# Patient Record
Sex: FEMALE | Race: WHITE
[De-identification: names, ages, dates, MRNs, and addresses within clinical notes are randomized per-mention and may not be internally consistent; named-entity substitution may affect disease eponyms.]

---

## 2021-04-08 ENCOUNTER — TRANSCRIPTION ENCOUNTER (OUTPATIENT)
Age: 66
End: 2021-04-08

## 2021-04-14 ENCOUNTER — EMERGENCY (EMERGENCY)
Facility: HOSPITAL | Age: 66
LOS: 0 days | Discharge: HOME | End: 2021-04-14
Attending: EMERGENCY MEDICINE | Admitting: EMERGENCY MEDICINE
Payer: MEDICARE

## 2021-04-14 VITALS
TEMPERATURE: 98 F | HEART RATE: 85 BPM | SYSTOLIC BLOOD PRESSURE: 181 MMHG | OXYGEN SATURATION: 100 % | WEIGHT: 156.09 LBS | DIASTOLIC BLOOD PRESSURE: 85 MMHG | RESPIRATION RATE: 16 BRPM

## 2021-04-14 DIAGNOSIS — J45.909 UNSPECIFIED ASTHMA, UNCOMPLICATED: ICD-10-CM

## 2021-04-14 DIAGNOSIS — J02.9 ACUTE PHARYNGITIS, UNSPECIFIED: ICD-10-CM

## 2021-04-14 DIAGNOSIS — I10 ESSENTIAL (PRIMARY) HYPERTENSION: ICD-10-CM

## 2021-04-14 DIAGNOSIS — E01.0 IODINE-DEFICIENCY RELATED DIFFUSE (ENDEMIC) GOITER: ICD-10-CM

## 2021-04-14 PROBLEM — Z00.00 ENCOUNTER FOR PREVENTIVE HEALTH EXAMINATION: Status: ACTIVE | Noted: 2021-04-14

## 2021-04-14 PROCEDURE — 99284 EMERGENCY DEPT VISIT MOD MDM: CPT

## 2021-04-14 PROCEDURE — 93010 ELECTROCARDIOGRAM REPORT: CPT

## 2021-04-14 RX ORDER — DEXAMETHASONE 0.5 MG/5ML
10 ELIXIR ORAL ONCE
Refills: 0 | Status: COMPLETED | OUTPATIENT
Start: 2021-04-14 | End: 2021-04-14

## 2021-04-14 RX ADMIN — Medication 10 MILLIGRAM(S): at 06:43

## 2021-04-14 NOTE — ED PROVIDER NOTE - PROVIDER TOKENS
PROVIDER:[TOKEN:[[id number]],FOLLOWUP:[7-10 Days],ESTABLISHEDPATIENT:[T]],PROVIDER:[TOKEN:[[id number]],FOLLOWUP:[7-10 Days]]

## 2021-04-14 NOTE — ED PROVIDER NOTE - CARE PROVIDERS DIRECT ADDRESSES
,DirectAddress_Unknown,[email or web address]

## 2021-04-14 NOTE — ED PROVIDER NOTE - CLINICAL SUMMARY MEDICAL DECISION MAKING FREE TEXT BOX
sore throat, vocal hoarseness, thyromegaly on exam, op thyroid US scheduled for 4/18/21 - vss, ekg, airway intact, tolerating po - decadron, return precautions discussed, op thyroid US as scheduled, op PCP/Endo f/u

## 2021-04-14 NOTE — ED PROVIDER NOTE - PATIENT PORTAL LINK FT
You can access the FollowMyHealth Patient Portal offered by [facility] by registering at the following website: [email or web address]. By joining [employer]’s FollowMyHealth portal, you will also be able to view your health information using other applications (apps) compatible with our system.

## 2021-04-14 NOTE — ED PROVIDER NOTE - CARE PROVIDER_API CALL
Liset Smith)  Internal Medicine  [address] [place] [id number]  Phone: [phone]  Fax: [phone]  Established Patient  Follow Up Time: 7-10 Days    Ruchi Morales  INTERNAL MEDICINE  [address] [place] [id number]  Phone: [phone]  Fax: [phone]  Follow Up Time: 7-10 Days

## 2021-04-14 NOTE — ED PROVIDER NOTE - OBJECTIVE STATEMENT
66F h/o asthma, htn, hl p/w sore throat x 2 wks. Accomp by hoarseness. Seen @ outside US 4/8, rapid covid neg, strep neg. Has been taking motrin, however reports hot flashes & flushing. Saw PCP Dr. Smith 3d ago for f/u, was given Rx for prednisone, took x 2d but stopped bc she started feeling jittery w/palpitations. Was also given Rx to obtain op thyroid US, and was to be given Endo referral today. Pt denies any known h/o thyroid d/o. Denies f/c, rhinorrhea, cough, cp/sob/ortega, nv, abd pain, edema.

## 2021-04-14 NOTE — ED PROVIDER NOTE - PHYSICAL EXAMINATION
PE:  nad  skin warm, dry  ncat  perrl/eomi  eac/tms clear, no rhinorrhea, op clear pharynx nl no tonsillar hypertrophy or exudates, uvula midline, mild vocal hoarseness, no stridor/drooling  neck supple, +thyromegaly  rrr nl s1s2 no mrg  ctab no wrr  abd soft ntnd no palpable masses no rgr  back non-tender no cvat  ext no cce dpi  neuro aaox3 grossly nf exam

## 2021-04-14 NOTE — ED ADULT NURSE NOTE - OBJECTIVE STATEMENT
Pt is c/o sore throat and difficulties [id number]. Pt was prescribed prednisone, but stopped due to hot flashes'

## 2021-04-14 NOTE — ED ADULT TRIAGE NOTE - CHIEF COMPLAINT QUOTE
pt c.o. sore throat   was prescribed ibuprofen and prednisone and stopped taking them due to "hot flashes"

## 2021-04-14 NOTE — ED PROVIDER NOTE - NSFOLLOWUPINSTRUCTIONS_ED_ALL_ED_FT
YOU HAVE BEEN FOUND WITH AN ENLARGED THYROID ON PHYSICAL EXAM. PLEASE FOLLOW-UP FOR YOUR PREVIOUSLY SCHEDULE THYROID ULTRASOUND THIS SUNDAY 4/18/21. PLEASE FOLLOW-UP WITH YOUR PCP, AND AN ENDOCRINOLOGIST.    Sore Throat    A sore throat is pain, burning, irritation, or scratchiness in the throat. When you have a sore throat, you may feel pain or tenderness in your throat when you swallow or talk.    Many things can cause a sore throat, including:    An infection.  Seasonal allergies.  Dryness in the air.  Irritants, such as smoke or pollution.  Gastroesophageal reflux disease (GERD).  A tumor.    A sore throat is often the first sign of another sickness. It may happen with other symptoms, such as coughing, sneezing, fever, and swollen neck glands. Most sore throats go away without medical treatment.     HOME CARE INSTRUCTIONS  Take over-the-counter medicines only as told by your health care provider.  Drink enough fluids to keep your urine clear or pale yellow.  Rest as needed.  To help with pain, try:  Sipping warm liquids, such as broth, herbal tea, or warm water.  Eating or drinking cold or frozen liquids, such as frozen ice pops.  Gargling with a salt-water mixture 3–4 times a day or as needed. To make a salt-water mixture, completely dissolve ½–1 tsp of salt in 1 cup of warm water.  Sucking on hard candy or throat lozenges.  Putting a cool-mist humidifier in your bedroom at night to moisten the air.  Sitting in the bathroom with the door closed for 5–10 minutes while you run hot water in the shower.  Do not use any tobacco products, such as cigarettes, chewing tobacco, and e-cigarettes. If you need help quitting, ask your health care provider.    SEEK MEDICAL CARE IF:  You have a fever for more than 2–3 days.  You have symptoms that last (are persistent) for more than 2–3 days.  Your throat does not get better within 7 days.  You have a fever and your symptoms suddenly get worse.    SEEK IMMEDIATE MEDICAL CARE IF:  You have difficulty breathing.  You cannot swallow fluids, soft foods, or your saliva.  You have increased swelling in your throat or neck.  You have persistent nausea and vomiting.    ADDITIONAL NOTES AND INSTRUCTIONS    Please follow up with your Primary MD in 24-48 hr.  Seek immediate medical care for any new/worsening signs or symptoms.

## 2021-04-14 NOTE — ED PROVIDER NOTE - NS ED ROS FT
Constitutional: No fever, chills, unintended weight loss.  Eyes:  No visual changes, eye pain or discharge.  ENMT: see hpi  Cardiac:  No chest pain, SOB or edema. No chest pain with exertion.  Respiratory:  No cough or respiratory distress. No hemoptysis. No history of asthma or RAD.  GI:  No nausea, vomiting, diarrhea or abdominal pain.  :  No dysuria, frequency or burning.  MS:  No myalgia, muscle weakness, joint pain or back pain.  Neuro:  No headache or weakness.  No LOC.  Skin:  No skin rash.   Endocrine: No history of thyroid disease or diabetes.

## 2021-04-18 ENCOUNTER — OUTPATIENT (OUTPATIENT)
Dept: OUTPATIENT SERVICES | Facility: HOSPITAL | Age: 66
LOS: 1 days | Discharge: HOME | End: 2021-04-18
Payer: MEDICARE

## 2021-04-18 DIAGNOSIS — E04.1 NONTOXIC SINGLE THYROID NODULE: ICD-10-CM

## 2021-04-18 PROCEDURE — 76536 US EXAM OF HEAD AND NECK: CPT | Mod: 26

## 2021-04-21 ENCOUNTER — APPOINTMENT (OUTPATIENT)
Dept: ENDOCRINOLOGY | Facility: CLINIC | Age: 66
End: 2021-04-21
Payer: MEDICARE

## 2021-04-21 VITALS
TEMPERATURE: 98 F | DIASTOLIC BLOOD PRESSURE: 82 MMHG | WEIGHT: 156 LBS | BODY MASS INDEX: 25.99 KG/M2 | HEIGHT: 65 IN | SYSTOLIC BLOOD PRESSURE: 160 MMHG

## 2021-04-21 DIAGNOSIS — Z87.09 PERSONAL HISTORY OF OTHER DISEASES OF THE RESPIRATORY SYSTEM: ICD-10-CM

## 2021-04-21 DIAGNOSIS — Z78.9 OTHER SPECIFIED HEALTH STATUS: ICD-10-CM

## 2021-04-21 DIAGNOSIS — E06.0 ACUTE THYROIDITIS: ICD-10-CM

## 2021-04-21 DIAGNOSIS — Z86.79 PERSONAL HISTORY OF OTHER DISEASES OF THE CIRCULATORY SYSTEM: ICD-10-CM

## 2021-04-21 DIAGNOSIS — Z83.49 FAMILY HISTORY OF OTHER ENDOCRINE, NUTRITIONAL AND METABOLIC DISEASES: ICD-10-CM

## 2021-04-21 PROCEDURE — 99204 OFFICE O/P NEW MOD 45 MIN: CPT

## 2021-04-21 RX ORDER — TELMISARTAN 20 MG/1
TABLET ORAL
Refills: 0 | Status: ACTIVE | COMMUNITY

## 2021-04-21 NOTE — ASSESSMENT
[FreeTextEntry1] : 66 year old patient who was referred by Dr good for evaluation of possible thyroiditis \par \par -neck pain , dysphagia :\par likely acute thyroiditis , normal tft's , heterogenous gland on US , no nodules, pain improving, couldn't  tolerate steroids and ibuprofen did not help \par monitor symptoms, likely will improve soon, can take tylenol if in too much pain\par advised to restart soft diet and advance as tolerated \par discussed hyperthyroidism /hypothyroidism symptoms and patient will report to us if notice any new symptoms \par recheck tft's in 6 weeks from now earlier if reccurence of symptoms \par \par

## 2021-04-21 NOTE — HISTORY OF PRESENT ILLNESS
[FreeTextEntry1] : 66 year old patient who was referred by Dr good for evaluation of possible thyroiditis \par \par 4/8/21 : patient noted sore throat and pain in the neck , was prescribed ibuprofen with no improvement in symptoms, saw dr good and tft's done were normal was started on prednisone  for suspected thyroiditis , but developed SOB and ? reaction to prednisone went to hospital , had EKG and was ok , was given dexamethasone, but also felt flushing and h/a so did not take it . Since then pain is improving ,  a little over left side but patient is afraid to eat.  \par no palpitations, no fever, chills, no diarrhea, no heat intolerance, no constipation, no hair loss or skin changes.\par \par

## 2021-04-21 NOTE — PHYSICAL EXAM
[Alert] : alert [Well Nourished] : well nourished [Healthy Appearance] : healthy appearance [No Acute Distress] : no acute distress [Well Developed] : well developed [No Proptosis] : no proptosis [No Lid Lag] : no lid lag [No Thyroid Nodules] : no palpable thyroid nodules [No Respiratory Distress] : no respiratory distress [No Accessory Muscle Use] : no accessory muscle use [Clear to Auscultation] : lungs were clear to auscultation bilaterally [Normal S1, S2] : normal S1 and S2 [No Murmurs] : no murmurs [Normal Rate] : heart rate was normal [Not Tender] : non-tender [Not Distended] : not distended [Soft] : abdomen soft [No Stigmata of Cushings Syndrome] : no stigmata of Cushings Syndrome [Abdominal Striae] : no abdominal striae [Acanthosis Nigricans] : no acanthosis nigricans [Normal Reflexes] : deep tendon reflexes were 2+ and symmetric [No Tremors] : no tremors [Oriented x3] : oriented to person, place, and time [de-identified] : thyroid not enlarged, no bruit, tender to palpation over left side only

## 2021-04-21 NOTE — DATA REVIEWED
[FreeTextEntry1] : 4/12/21: TSH 2.10  FT4 1.3  FT4 2.19  cbc normal \par \par \par thyroid US (4/18/21) heterogenous thyroid without discrete nodule

## 2021-04-21 NOTE — CONSULT LETTER
[Dear  ___] : Dear  [unfilled], [Consult Letter:] : I had the pleasure of evaluating your patient, [unfilled]. [Please see my note below.] : Please see my note below. [Consult Closing:] : Thank you very much for allowing me to participate in the care of this patient.  If you have any questions, please do not hesitate to contact me. [FreeTextEntry3] : Sadie Walker  [Sincerely,] : Sincerely,

## 2021-04-21 NOTE — REVIEW OF SYSTEMS
[Dysphagia] : dysphagia [Neck Pain] : neck pain [Dysphonia] : dysphonia [Chest Pain] : no chest pain [Slow Heart Rate] : heart rate is not slow [Palpitations] : no palpitations [Lower Ext Edema] : no lower extremity edema [Shortness Of Breath] : no shortness of breath [SOB on Exertion] : no shortness of breath on exertion [Nausea] : no nausea [Constipation] : no constipation [Vomiting] : no vomiting [Diarrhea] : no diarrhea [As Noted in HPI] : as noted in HPI [Acanthosis] : no acanthosis  [Acne] : no acne [Headaches] : no headaches [Tremors] : no tremors [Cold Intolerance] : no cold intolerance [Heat Intolerance] : no heat intolerance [Hot Flashes] : no hot flashes

## 2021-05-10 ENCOUNTER — APPOINTMENT (OUTPATIENT)
Dept: OTOLARYNGOLOGY | Facility: CLINIC | Age: 66
End: 2021-05-10
Payer: MEDICARE

## 2021-05-10 VITALS — HEIGHT: 65 IN | BODY MASS INDEX: 25.99 KG/M2 | WEIGHT: 156 LBS

## 2021-05-10 PROCEDURE — 99204 OFFICE O/P NEW MOD 45 MIN: CPT | Mod: 25

## 2021-05-10 PROCEDURE — 31575 DIAGNOSTIC LARYNGOSCOPY: CPT

## 2021-05-10 RX ORDER — CHLORHEXIDINE GLUCONATE, 0.12% ORAL RINSE 1.2 MG/ML
0.12 SOLUTION DENTAL
Qty: 473 | Refills: 0 | Status: ACTIVE | COMMUNITY
Start: 2020-12-15

## 2021-05-10 RX ORDER — IBUPROFEN 600 MG/1
600 TABLET, FILM COATED ORAL
Qty: 28 | Refills: 0 | Status: ACTIVE | COMMUNITY
Start: 2021-04-08

## 2021-05-10 NOTE — PHYSICAL EXAM
[Normal] : mucosa is normal [Midline] : trachea located in midline position [de-identified] : right scm tenderness

## 2021-05-10 NOTE — HISTORY OF PRESENT ILLNESS
[de-identified] : Patient presents today c/o swollen/ sore throat.  Began one month ago. Was seen in urgent care told it was virus.  After a week she followed up with her PCP and ultrasound was ordered. Was seen by endocrine and PCP and labs ordered were normal . Right side of throat is worst then left side, can only swallow soft foods. She has hoarse voice and dysphagia. She denies reflux or PND

## 2021-05-10 NOTE — HISTORY OF PRESENT ILLNESS
[de-identified] : Patient presents today c/o swollen/ sore throat.  Began one month ago. Was seen in urgent care told it was virus.  After a week she followed up with her PCP and ultrasound was ordered. Was seen by endocrine and PCP and labs ordered were normal . Right side of throat is worst then left side, can only swallow soft foods. She has hoarse voice and dysphagia. She denies reflux or PND

## 2021-05-10 NOTE — REASON FOR VISIT
[Initial Evaluation] : an initial evaluation for [FreeTextEntry2] : see other note for 5/10 for  documentation and billing

## 2021-05-10 NOTE — PHYSICAL EXAM
[Normal] : mucosa is normal [Midline] : trachea located in midline position [de-identified] : right scm tenderness

## 2021-05-12 ENCOUNTER — RX RENEWAL (OUTPATIENT)
Age: 66
End: 2021-05-12

## 2021-06-14 ENCOUNTER — APPOINTMENT (OUTPATIENT)
Dept: OTOLARYNGOLOGY | Facility: CLINIC | Age: 66
End: 2021-06-14
Payer: MEDICARE

## 2021-06-14 PROCEDURE — 31237 NSL/SINS NDSC SURG BX POLYPC: CPT | Mod: RT

## 2021-06-14 PROCEDURE — 31575 DIAGNOSTIC LARYNGOSCOPY: CPT

## 2021-06-14 PROCEDURE — 99214 OFFICE O/P EST MOD 30 MIN: CPT | Mod: 25

## 2021-06-14 NOTE — ASSESSMENT
[FreeTextEntry1] : Pt was counselled on a low acid acid and appropriate dietary modifications.\par \par Pt will continue meds.

## 2021-06-14 NOTE — REASON FOR VISIT
[Subsequent Evaluation] : a subsequent evaluation for [FreeTextEntry2] : neck swelling , throat pain , LPRD

## 2021-06-14 NOTE — PROCEDURE
[Rigid Endoscope] : examined with a rigid endoscope [___ cm] : [unfilled]Ucm polyp(s) on the right [FreeTextEntry6] : nasal mass  removed from right nasal cavity [Flexible Endoscope] : examined with the flexible endoscope [True Vocal Cords Erythematous] : bilateral true vocal cord edema [Glottis Arytenoid Cartilages Erythema] : bilateral arytenoid ~M erythema [Normal] : posterior cricoid area had healthy pink mucosa in the interarytenoid area and the esophageal inlet [de-identified] : nasal polyps on the right \par larynx improving

## 2021-06-14 NOTE — HISTORY OF PRESENT ILLNESS
[FreeTextEntry1] : Patient returns today following up on  neck swelling , throat pain , LPRD .  She is doing better.  Slight discomfort when swallowing .

## 2021-06-18 LAB — CORE LAB BIOPSY: NORMAL

## 2021-06-28 ENCOUNTER — APPOINTMENT (OUTPATIENT)
Dept: OTOLARYNGOLOGY | Facility: CLINIC | Age: 66
End: 2021-06-28
Payer: MEDICARE

## 2021-06-28 DIAGNOSIS — M60.9 MYOSITIS, UNSPECIFIED: ICD-10-CM

## 2021-06-28 PROCEDURE — 31231 NASAL ENDOSCOPY DX: CPT

## 2021-06-28 PROCEDURE — 99213 OFFICE O/P EST LOW 20 MIN: CPT | Mod: 25

## 2021-06-28 NOTE — HISTORY OF PRESENT ILLNESS
[FreeTextEntry1] : patient returns today following up on neck swelling , nasal mass. Had tissue biopsy at last visit , here to discuss results . She is doing well since last visit . Pathology was reviewed and shows benign polyp. Pt is doing well from a reflux standpoint on meds and diet.

## 2021-12-14 ENCOUNTER — APPOINTMENT (OUTPATIENT)
Dept: OTOLARYNGOLOGY | Facility: CLINIC | Age: 66
End: 2021-12-14
Payer: MEDICARE

## 2021-12-14 DIAGNOSIS — R22.1 LOCALIZED SWELLING, MASS AND LUMP, NECK: ICD-10-CM

## 2021-12-14 DIAGNOSIS — R07.0 PAIN IN THROAT: ICD-10-CM

## 2021-12-14 DIAGNOSIS — K21.9 GASTRO-ESOPHAGEAL REFLUX DISEASE W/OUT ESOPHAGITIS: ICD-10-CM

## 2021-12-14 PROCEDURE — 31231 NASAL ENDOSCOPY DX: CPT

## 2021-12-14 PROCEDURE — 99214 OFFICE O/P EST MOD 30 MIN: CPT | Mod: 25

## 2021-12-14 RX ORDER — FAMOTIDINE 40 MG/1
40 TABLET, FILM COATED ORAL
Qty: 90 | Refills: 0 | Status: ACTIVE | COMMUNITY
Start: 2021-05-10 | End: 1900-01-01

## 2021-12-14 NOTE — HISTORY OF PRESENT ILLNESS
[FreeTextEntry1] : Patient returns today following up on  nasal polyps, LPRD .  Has improvement since last visit.   Hasn't been on reflux  medication .

## 2021-12-14 NOTE — PROCEDURE
[None] : none [Flexible Endoscope] : examined with the flexible endoscope [Normal] : posterior cricoid area had healthy pink mucosa in the interarytenoid area and the esophageal inlet [FreeTextEntry6] : right nasal polyp

## 2022-02-14 ENCOUNTER — RX RENEWAL (OUTPATIENT)
Age: 67
End: 2022-02-14

## 2022-02-23 RX ORDER — DIAZEPAM 10 MG/1
10 TABLET ORAL
Qty: 1 | Refills: 0 | Status: ACTIVE | COMMUNITY
Start: 2022-02-23 | End: 1900-01-01

## 2022-02-24 ENCOUNTER — APPOINTMENT (OUTPATIENT)
Dept: OTOLARYNGOLOGY | Facility: CLINIC | Age: 67
End: 2022-02-24
Payer: MEDICARE

## 2022-02-24 DIAGNOSIS — J34.89 OTHER SPECIFIED DISORDERS OF NOSE AND NASAL SINUSES: ICD-10-CM

## 2022-02-24 PROCEDURE — 31237 NSL/SINS NDSC SURG BX POLYPC: CPT | Mod: 50

## 2022-02-24 NOTE — PROCEDURE
[FreeTextEntry1] : polyp removal bilateral  [FreeTextEntry2] : nasal polyps  [FreeTextEntry3] : Topical anesthetic was applied. a Blakesley forceps was then used to remove polyps from the middle meatus and the ethmoid regions bilaterally. Hemostasis was achieved with nasal packing. Pt tolerated te procedure well.

## 2022-03-08 LAB — CORE LAB BIOPSY: NORMAL

## 2022-03-10 ENCOUNTER — APPOINTMENT (OUTPATIENT)
Dept: OTOLARYNGOLOGY | Facility: CLINIC | Age: 67
End: 2022-03-10
Payer: MEDICARE

## 2022-03-10 PROCEDURE — 31231 NASAL ENDOSCOPY DX: CPT

## 2022-03-10 PROCEDURE — 99213 OFFICE O/P EST LOW 20 MIN: CPT | Mod: 25

## 2022-03-10 RX ORDER — FLUTICASONE PROPIONATE 93 UG/1
93 SPRAY, METERED NASAL
Qty: 1 | Refills: 10 | Status: ACTIVE | COMMUNITY
Start: 2022-02-24 | End: 1900-01-01

## 2022-03-10 NOTE — HISTORY OF PRESENT ILLNESS
[FreeTextEntry1] : Patient here b/l nasal polypectomy 2/24/22.   She is  doing well , no issues .   she  is able to breathe  well .

## 2022-03-10 NOTE — PHYSICAL EXAM
[Nasal Endoscopy Performed] : nasal endoscopy was performed, see procedure section for findings [Normal] : external appearance is normal [de-identified] : thickened

## 2022-05-18 ENCOUNTER — RX RENEWAL (OUTPATIENT)
Age: 67
End: 2022-05-18

## 2022-05-18 RX ORDER — PANTOPRAZOLE 40 MG/1
40 TABLET, DELAYED RELEASE ORAL
Qty: 90 | Refills: 0 | Status: ACTIVE | COMMUNITY
Start: 2021-05-10 | End: 1900-01-01

## 2022-05-19 ENCOUNTER — APPOINTMENT (OUTPATIENT)
Dept: OTOLARYNGOLOGY | Facility: CLINIC | Age: 67
End: 2022-05-19
Payer: MEDICARE

## 2022-05-19 DIAGNOSIS — J33.9 NASAL POLYP, UNSPECIFIED: ICD-10-CM

## 2022-05-19 DIAGNOSIS — Z98.890 OTHER SPECIFIED POSTPROCEDURAL STATES: ICD-10-CM

## 2022-05-19 PROCEDURE — 31231 NASAL ENDOSCOPY DX: CPT

## 2022-05-19 PROCEDURE — 99213 OFFICE O/P EST LOW 20 MIN: CPT | Mod: 25

## 2022-05-19 NOTE — PROCEDURE
[Post-Op Patency] : post-op patency [None] : none [Rigid Endoscope] : examined with a rigid endoscope [Normal] : the paranasal sinuses had no abnormalities [FreeTextEntry6] : no polyps visible

## 2022-05-19 NOTE — HISTORY OF PRESENT ILLNESS
[FreeTextEntry1] : Patient here b/l nasal polypectomy 2/24/22.   She is feel great , no issues with breathe . Pt is not on nasal sprays. Pt has good smell and taste.

## 2022-11-15 ENCOUNTER — NON-APPOINTMENT (OUTPATIENT)
Age: 67
End: 2022-11-15

## 2022-12-15 ENCOUNTER — APPOINTMENT (OUTPATIENT)
Age: 67
End: 2022-12-15

## 2022-12-21 ENCOUNTER — APPOINTMENT (OUTPATIENT)
Dept: PULMONOLOGY | Facility: CLINIC | Age: 67
End: 2022-12-21

## 2022-12-21 VITALS
SYSTOLIC BLOOD PRESSURE: 120 MMHG | BODY MASS INDEX: 25.22 KG/M2 | RESPIRATION RATE: 14 BRPM | DIASTOLIC BLOOD PRESSURE: 80 MMHG | WEIGHT: 151.38 LBS | HEART RATE: 70 BPM | OXYGEN SATURATION: 99 % | HEIGHT: 65 IN

## 2022-12-21 PROCEDURE — 94010 BREATHING CAPACITY TEST: CPT

## 2022-12-21 PROCEDURE — 99204 OFFICE O/P NEW MOD 45 MIN: CPT | Mod: 25

## 2022-12-21 RX ORDER — BUDESONIDE 180 UG/1
180 AEROSOL, POWDER RESPIRATORY (INHALATION)
Qty: 1 | Refills: 0 | Status: DISCONTINUED | COMMUNITY
Start: 2021-01-14 | End: 2022-12-21

## 2022-12-21 RX ORDER — CEPHALEXIN 500 MG/1
500 CAPSULE ORAL
Qty: 21 | Refills: 0 | Status: DISCONTINUED | COMMUNITY
Start: 2021-04-26 | End: 2022-12-21

## 2022-12-21 RX ORDER — AMOXICILLIN 500 MG/1
500 CAPSULE ORAL
Qty: 21 | Refills: 0 | Status: DISCONTINUED | COMMUNITY
Start: 2020-12-15 | End: 2022-12-21

## 2022-12-21 RX ORDER — METHYLPREDNISOLONE 4 MG/1
4 TABLET ORAL
Qty: 21 | Refills: 0 | Status: DISCONTINUED | COMMUNITY
Start: 2021-04-14 | End: 2022-12-21

## 2022-12-21 RX ORDER — ALBUTEROL SULFATE 90 UG/1
108 (90 BASE) INHALANT RESPIRATORY (INHALATION)
Qty: 7 | Refills: 0 | Status: DISCONTINUED | COMMUNITY
Start: 2021-01-14 | End: 2022-12-21

## 2022-12-21 RX ORDER — PREDNISONE 20 MG/1
20 TABLET ORAL
Qty: 20 | Refills: 0 | Status: DISCONTINUED | COMMUNITY
Start: 2021-04-12 | End: 2022-12-21

## 2022-12-21 NOTE — PROCEDURE
[FreeTextEntry1] : In-office spirometry without bronchodilator was performed.\par \par Spirometry demonstrates moderate obstructive disease by ATS criteria.  Flow volume loops demonstrate small airway obstruction.

## 2022-12-21 NOTE — HISTORY OF PRESENT ILLNESS
[Never] : never [TextBox_4] : Ms. TRIPP is a 67 year woman with a medical history significant for mild asthma presenting today to the clinic for subacute cough. She was referred by her PCP for further evaluation of bronchitis/asthma\par \par Ms Tripp's symptoms began on November 16, with acute onset cough and malaise.  She presented the following day to urgent care for testing for COVID, and was found to be negative.  She underwent chest x-ray which she was told showed bronchitis.  At this point she was already taking Pulmicort and albuterol quite regularly without much relief.  For the next several weeks she continued to take these 2 medications, still without relief and significant mucus production, until an appointment with her primary care doctor the week of December 14.\par \par Her primary care doctor started her on Breo, and she was given a 5-day course of azithromycin, and told to follow-up with pulmonary.  At this time, her cough symptoms have almost completely abated.  She reports never having had an asthma flare like this before, and has never had a bronchitis episode that has lasted so long.\par \par Occupational Hx: retired [employer] employee\par

## 2023-03-13 ENCOUNTER — APPOINTMENT (OUTPATIENT)
Dept: PULMONOLOGY | Facility: CLINIC | Age: 68
End: 2023-03-13
Payer: MEDICARE

## 2023-03-13 VITALS
BODY MASS INDEX: 24.99 KG/M2 | RESPIRATION RATE: 14 BRPM | OXYGEN SATURATION: 98 % | HEART RATE: 71 BPM | HEIGHT: 65 IN | SYSTOLIC BLOOD PRESSURE: 120 MMHG | WEIGHT: 150 LBS | DIASTOLIC BLOOD PRESSURE: 80 MMHG

## 2023-03-13 PROCEDURE — 99213 OFFICE O/P EST LOW 20 MIN: CPT | Mod: GC

## 2023-03-13 NOTE — HISTORY OF PRESENT ILLNESS
[Never] : never [TextBox_4] : Ms. TRIPP is a 67 year woman with a medical history significant for mild asthma presenting today to the clinic for subacute cough. She was referred by her PCP for further evaluation of bronchitis/asthma\par \par Patient summary:\par Ms Tripp's symptoms began on November 16, with acute onset cough and malaise.  She presented the following day to urgent care for testing for COVID, and was found to be negative.  She underwent chest x-ray which she was told showed bronchitis.  At this point she was already taking Pulmicort and albuterol quite regularly without much relief.  For the next several weeks she continued to take these 2 medications, still without relief and significant mucus production, until an appointment with her primary care doctor the week of December 14.\par \par Her primary care doctor started her on Breo, and she was given a 5-day course of azithromycin, and told to follow-up with pulmonary.  At this time, her cough symptoms have almost completely abated.  She reports never having had an asthma flare like this before, and has never had a bronchitis episode that has lasted so long.  At our initial visit, we started her on PRN Symbicort based on obstructive findings on spirometry.\par \par Occupational Hx: retired [employer] employee\par \par \par Interval history:\par 1x/day breo\par 0x/day albuterol\par 0x/week night awakening\par change in med made her sx so much better\par Denies any symptoms of any kind at this point\par

## 2023-06-07 ENCOUNTER — OUTPATIENT (OUTPATIENT)
Dept: OUTPATIENT SERVICES | Facility: HOSPITAL | Age: 68
LOS: 1 days | End: 2023-06-07
Payer: MEDICARE

## 2023-06-07 ENCOUNTER — APPOINTMENT (OUTPATIENT)
Dept: PULMONOLOGY | Facility: HOSPITAL | Age: 68
End: 2023-06-07
Payer: MEDICARE

## 2023-06-07 DIAGNOSIS — J45.40 MODERATE PERSISTENT ASTHMA, UNCOMPLICATED: ICD-10-CM

## 2023-06-07 PROCEDURE — 94070 EVALUATION OF WHEEZING: CPT

## 2023-06-07 PROCEDURE — 94060 EVALUATION OF WHEEZING: CPT | Mod: 26

## 2023-06-07 PROCEDURE — 94726 PLETHYSMOGRAPHY LUNG VOLUMES: CPT | Mod: 26

## 2023-06-07 PROCEDURE — 94729 DIFFUSING CAPACITY: CPT | Mod: 26

## 2023-06-07 PROCEDURE — 94726 PLETHYSMOGRAPHY LUNG VOLUMES: CPT

## 2023-06-07 PROCEDURE — 94729 DIFFUSING CAPACITY: CPT

## 2023-06-08 DIAGNOSIS — J45.40 MODERATE PERSISTENT ASTHMA, UNCOMPLICATED: ICD-10-CM

## 2023-06-21 ENCOUNTER — APPOINTMENT (OUTPATIENT)
Dept: PULMONOLOGY | Facility: CLINIC | Age: 68
End: 2023-06-21
Payer: MEDICARE

## 2023-06-21 VITALS
HEIGHT: 65 IN | OXYGEN SATURATION: 97 % | HEART RATE: 65 BPM | SYSTOLIC BLOOD PRESSURE: 130 MMHG | WEIGHT: 154 LBS | DIASTOLIC BLOOD PRESSURE: 84 MMHG | BODY MASS INDEX: 25.66 KG/M2

## 2023-06-21 PROCEDURE — 99214 OFFICE O/P EST MOD 30 MIN: CPT

## 2023-06-21 RX ORDER — BUDESONIDE AND FORMOTEROL FUMARATE DIHYDRATE 80; 4.5 UG/1; UG/1
80-4.5 AEROSOL RESPIRATORY (INHALATION) 4 TIMES DAILY
Qty: 1 | Refills: 5 | Status: DISCONTINUED | COMMUNITY
Start: 2022-12-21 | End: 2023-06-21

## 2023-06-21 NOTE — HISTORY OF PRESENT ILLNESS
[Never] : never [TextBox_4] : Ms. TRIPP is a 67 year woman with a medical history significant for mild asthma presenting today to the clinic for subacute cough. She was referred by her PCP for further evaluation of bronchitis/asthma\par \par Patient summary:\par Ms Tripp's symptoms began on November 16, with acute onset cough and malaise.  She presented the following day to urgent care for testing for COVID, and was found to be negative.  She underwent chest x-ray which she was told showed bronchitis.  At this point she was already taking Pulmicort and albuterol quite regularly without much relief.  For the next several weeks she continued to take these 2 medications, still without relief and significant mucus production, until an appointment with her primary care doctor the week of December 14.\par \par Her primary care doctor started her on Breo, and she was given a 5-day course of azithromycin, and told to follow-up with pulmonary.  At this time, her cough symptoms have almost completely abated.  She reports never having had an asthma flare like this before, and has never had a bronchitis episode that has lasted so long.  At our initial visit, we started her on PRN Symbicort based on obstructive findings on spirometry.\par \par Occupational Hx: retired [employer] employee\par \par \par Interval history:\par 1x/day breo\par 0x/day albuterol\par 0x/week night awakening\par change in med made her sx so much better\par Denies any symptoms of any kind at this point\par We reviewed her PFT results which demonstrated mild obstruction\par

## 2023-06-21 NOTE — PHYSICAL EXAM
[No Acute Distress] : no acute distress [Normal Oropharynx] : normal oropharynx [Normal Appearance] : normal appearance [No Neck Mass] : no neck mass [No Murmurs] : no murmurs [No Resp Distress] : no resp distress [No Abnormalities] : no abnormalities [Benign] : benign [Normal Gait] : normal gait [No Clubbing] : no clubbing [No Cyanosis] : no cyanosis [No Edema] : no edema [FROM] : FROM [Normal Color/ Pigmentation] : normal color/ pigmentation [No Focal Deficits] : no focal deficits [Oriented x3] : oriented x3 [Normal Affect] : normal affect

## 2024-01-11 ENCOUNTER — APPOINTMENT (OUTPATIENT)
Dept: PULMONOLOGY | Facility: CLINIC | Age: 69
End: 2024-01-11
Payer: MEDICARE

## 2024-01-11 VITALS
WEIGHT: 150 LBS | HEART RATE: 74 BPM | BODY MASS INDEX: 24.99 KG/M2 | HEIGHT: 65 IN | RESPIRATION RATE: 14 BRPM | DIASTOLIC BLOOD PRESSURE: 70 MMHG | SYSTOLIC BLOOD PRESSURE: 110 MMHG | OXYGEN SATURATION: 99 %

## 2024-01-11 DIAGNOSIS — J45.40 MODERATE PERSISTENT ASTHMA, UNCOMPLICATED: ICD-10-CM

## 2024-01-11 DIAGNOSIS — R05.8 OTHER SPECIFIED COUGH: ICD-10-CM

## 2024-01-11 DIAGNOSIS — R05.9 COUGH, UNSPECIFIED: ICD-10-CM

## 2024-01-11 PROCEDURE — 99214 OFFICE O/P EST MOD 30 MIN: CPT

## 2024-01-11 RX ORDER — FLUTICASONE FUROATE AND VILANTEROL TRIFENATATE 100; 25 UG/1; UG/1
100-25 POWDER RESPIRATORY (INHALATION)
Qty: 1 | Refills: 4 | Status: ACTIVE | COMMUNITY
Start: 2023-06-21 | End: 1900-01-01

## 2024-01-11 NOTE — HISTORY OF PRESENT ILLNESS
[Never] : never [TextBox_4] : Ms. TRIPP is a 67 year woman with a medical history significant for mild asthma presenting today to the clinic for subacute cough. She was referred by her PCP for further evaluation of bronchitis/asthma  Patient summary: Ms Tripp's symptoms began on November 16, with acute onset cough and malaise.  She presented the following day to urgent care for testing for COVID, and was found to be negative.  She underwent chest x-ray which she was told showed bronchitis.  At this point she was already taking Pulmicort and albuterol quite regularly without much relief.  For the next several weeks she continued to take these 2 medications, still without relief and significant mucus production, until an appointment with her primary care doctor the week of December 14.  Her primary care doctor started her on Breo, and she was given a 5-day course of azithromycin, and told to follow-up with pulmonary.  At this time, her cough symptoms have almost completely abated.  She reports never having had an asthma flare like this before, and has never had a bronchitis episode that has lasted so long.  At our initial visit, we started her on PRN Symbicort based on obstructive findings on spirometry.  Due to insurance reasons she was changed to Breo, and her symptoms have essentially resolved as of June 2023 with maintenance medication.  Occupational Hx: retired [employer] employee   Interval history: 0-1x/day breo 0x/day albuterol 0x/week night awakening  Feels great, rarely ever needs her inhaler before.

## 2024-04-27 ENCOUNTER — EMERGENCY (EMERGENCY)
Facility: HOSPITAL | Age: 69
LOS: 0 days | Discharge: HOME | End: 2024-04-27
Attending: EMERGENCY MEDICINE
Payer: MEDICARE

## 2024-04-27 VITALS
WEIGHT: 156.09 LBS | SYSTOLIC BLOOD PRESSURE: 181 MMHG | RESPIRATION RATE: 17 BRPM | DIASTOLIC BLOOD PRESSURE: 91 MMHG | OXYGEN SATURATION: 100 % | HEART RATE: 80 BPM | TEMPERATURE: 98 F

## 2024-04-27 DIAGNOSIS — R31.9 HEMATURIA, UNSPECIFIED: ICD-10-CM

## 2024-04-27 DIAGNOSIS — Z90.710 ACQUIRED ABSENCE OF BOTH CERVIX AND UTERUS: ICD-10-CM

## 2024-04-27 DIAGNOSIS — I10 ESSENTIAL (PRIMARY) HYPERTENSION: ICD-10-CM

## 2024-04-27 DIAGNOSIS — R30.0 DYSURIA: ICD-10-CM

## 2024-04-27 DIAGNOSIS — N93.9 ABNORMAL UTERINE AND VAGINAL BLEEDING, UNSPECIFIED: ICD-10-CM

## 2024-04-27 DIAGNOSIS — N39.0 URINARY TRACT INFECTION, SITE NOT SPECIFIED: ICD-10-CM

## 2024-04-27 DIAGNOSIS — J45.909 UNSPECIFIED ASTHMA, UNCOMPLICATED: ICD-10-CM

## 2024-04-27 LAB
ALBUMIN SERPL ELPH-MCNC: 4.7 G/DL — SIGNIFICANT CHANGE UP (ref 3.5–5.2)
ALP SERPL-CCNC: 95 U/L — SIGNIFICANT CHANGE UP (ref 30–115)
ALT FLD-CCNC: 17 U/L — SIGNIFICANT CHANGE UP (ref 0–41)
ANION GAP SERPL CALC-SCNC: 9 MMOL/L — SIGNIFICANT CHANGE UP (ref 7–14)
APPEARANCE UR: ABNORMAL
AST SERPL-CCNC: 17 U/L — SIGNIFICANT CHANGE UP (ref 0–41)
BACTERIA # UR AUTO: NEGATIVE /HPF — SIGNIFICANT CHANGE UP
BASOPHILS # BLD AUTO: 0.04 K/UL — SIGNIFICANT CHANGE UP (ref 0–0.2)
BASOPHILS NFR BLD AUTO: 0.3 % — SIGNIFICANT CHANGE UP (ref 0–1)
BILIRUB SERPL-MCNC: 0.3 MG/DL — SIGNIFICANT CHANGE UP (ref 0.2–1.2)
BILIRUB UR-MCNC: NEGATIVE — SIGNIFICANT CHANGE UP
BUN SERPL-MCNC: 20 MG/DL — SIGNIFICANT CHANGE UP (ref 10–20)
CALCIUM SERPL-MCNC: 10.2 MG/DL — SIGNIFICANT CHANGE UP (ref 8.4–10.5)
CAST: 1 /LPF — SIGNIFICANT CHANGE UP (ref 0–4)
CHLORIDE SERPL-SCNC: 105 MMOL/L — SIGNIFICANT CHANGE UP (ref 98–110)
CO2 SERPL-SCNC: 28 MMOL/L — SIGNIFICANT CHANGE UP (ref 17–32)
COLOR SPEC: ABNORMAL
CREAT SERPL-MCNC: 0.8 MG/DL — SIGNIFICANT CHANGE UP (ref 0.7–1.5)
DIFF PNL FLD: ABNORMAL
EGFR: 79 ML/MIN/1.73M2 — SIGNIFICANT CHANGE UP
EOSINOPHIL # BLD AUTO: 0.1 K/UL — SIGNIFICANT CHANGE UP (ref 0–0.7)
EOSINOPHIL NFR BLD AUTO: 0.7 % — SIGNIFICANT CHANGE UP (ref 0–8)
GLUCOSE SERPL-MCNC: 114 MG/DL — HIGH (ref 70–99)
GLUCOSE UR QL: NEGATIVE MG/DL — SIGNIFICANT CHANGE UP
HCT VFR BLD CALC: 39.3 % — SIGNIFICANT CHANGE UP (ref 37–47)
HGB BLD-MCNC: 13 G/DL — SIGNIFICANT CHANGE UP (ref 12–16)
IMM GRANULOCYTES NFR BLD AUTO: 0.2 % — SIGNIFICANT CHANGE UP (ref 0.1–0.3)
KETONES UR-MCNC: NEGATIVE MG/DL — SIGNIFICANT CHANGE UP
LEUKOCYTE ESTERASE UR-ACNC: ABNORMAL
LIDOCAIN IGE QN: 35 U/L — SIGNIFICANT CHANGE UP (ref 7–60)
LYMPHOCYTES # BLD AUTO: 1.09 K/UL — LOW (ref 1.2–3.4)
LYMPHOCYTES # BLD AUTO: 7.7 % — LOW (ref 20.5–51.1)
MCHC RBC-ENTMCNC: 30.6 PG — SIGNIFICANT CHANGE UP (ref 27–31)
MCHC RBC-ENTMCNC: 33.1 G/DL — SIGNIFICANT CHANGE UP (ref 32–37)
MCV RBC AUTO: 92.5 FL — SIGNIFICANT CHANGE UP (ref 81–99)
MONOCYTES # BLD AUTO: 1.08 K/UL — HIGH (ref 0.1–0.6)
MONOCYTES NFR BLD AUTO: 7.6 % — SIGNIFICANT CHANGE UP (ref 1.7–9.3)
NEUTROPHILS # BLD AUTO: 11.85 K/UL — HIGH (ref 1.4–6.5)
NEUTROPHILS NFR BLD AUTO: 83.5 % — HIGH (ref 42.2–75.2)
NITRITE UR-MCNC: NEGATIVE — SIGNIFICANT CHANGE UP
NRBC # BLD: 0 /100 WBCS — SIGNIFICANT CHANGE UP (ref 0–0)
PH UR: 5.5 — SIGNIFICANT CHANGE UP (ref 5–8)
PLATELET # BLD AUTO: 276 K/UL — SIGNIFICANT CHANGE UP (ref 130–400)
PMV BLD: 10.3 FL — SIGNIFICANT CHANGE UP (ref 7.4–10.4)
POTASSIUM SERPL-MCNC: 5.3 MMOL/L — HIGH (ref 3.5–5)
POTASSIUM SERPL-SCNC: 5.3 MMOL/L — HIGH (ref 3.5–5)
PROT SERPL-MCNC: 7.6 G/DL — SIGNIFICANT CHANGE UP (ref 6–8)
PROT UR-MCNC: 30 MG/DL
RBC # BLD: 4.25 M/UL — SIGNIFICANT CHANGE UP (ref 4.2–5.4)
RBC # FLD: 12.3 % — SIGNIFICANT CHANGE UP (ref 11.5–14.5)
RBC CASTS # UR COMP ASSIST: 2 /HPF — SIGNIFICANT CHANGE UP (ref 0–4)
SODIUM SERPL-SCNC: 142 MMOL/L — SIGNIFICANT CHANGE UP (ref 135–146)
SP GR SPEC: 1.01 — SIGNIFICANT CHANGE UP (ref 1–1.03)
SQUAMOUS # UR AUTO: 1 /HPF — SIGNIFICANT CHANGE UP (ref 0–5)
UROBILINOGEN FLD QL: 0.2 MG/DL — SIGNIFICANT CHANGE UP (ref 0.2–1)
WBC # BLD: 14.19 K/UL — HIGH (ref 4.8–10.8)
WBC # FLD AUTO: 14.19 K/UL — HIGH (ref 4.8–10.8)
WBC UR QL: 348 /HPF — HIGH (ref 0–5)

## 2024-04-27 PROCEDURE — 76830 TRANSVAGINAL US NON-OB: CPT | Mod: 26

## 2024-04-27 PROCEDURE — 87086 URINE CULTURE/COLONY COUNT: CPT

## 2024-04-27 PROCEDURE — 81001 URINALYSIS AUTO W/SCOPE: CPT

## 2024-04-27 PROCEDURE — 99284 EMERGENCY DEPT VISIT MOD MDM: CPT | Mod: 25

## 2024-04-27 PROCEDURE — 36415 COLL VENOUS BLD VENIPUNCTURE: CPT

## 2024-04-27 PROCEDURE — 83690 ASSAY OF LIPASE: CPT

## 2024-04-27 PROCEDURE — 87186 SC STD MICRODIL/AGAR DIL: CPT

## 2024-04-27 PROCEDURE — 85025 COMPLETE CBC W/AUTO DIFF WBC: CPT

## 2024-04-27 PROCEDURE — 80053 COMPREHEN METABOLIC PANEL: CPT

## 2024-04-27 PROCEDURE — 76830 TRANSVAGINAL US NON-OB: CPT

## 2024-04-27 PROCEDURE — 99284 EMERGENCY DEPT VISIT MOD MDM: CPT | Mod: GC

## 2024-04-27 RX ORDER — NITROFURANTOIN MACROCRYSTAL 50 MG
1 CAPSULE ORAL
Qty: 10 | Refills: 0
Start: 2024-04-27 | End: 2024-05-01

## 2024-04-27 NOTE — ED PROVIDER NOTE - PHYSICAL EXAMINATION
VITAL SIGNS: I have reviewed nursing notes and confirm.  CONSTITUTIONAL: well-appearing, non-toxic, NAD  SKIN: Warm dry, normal skin turgor  HEAD: NCAT  EYES: no scleral icterus  ENT: Moist mucous membranes  NECK: Supple; non tender. Full ROM.  CARD: RRR, no murmurs, rubs or gallops  RESP: clear to ausculation b/l.  No rales, rhonchi, or wheezing.  ABD: soft, non-tender, non-distended, no rebound or guarding. No CVA tenderness  EXT: Full ROM, no pedal edema  NEURO: normal motor. normal sensory. Normal gait.  PSYCH: Cooperative, appropriate.

## 2024-04-27 NOTE — ED ADULT NURSE NOTE - SUICIDE SCREENING DEPRESSION
[Initial Consultation] : an initial consultation [Medical Records] : medical records [Father] : father Negative

## 2024-04-27 NOTE — ED PROVIDER NOTE - NSFOLLOWUPINSTRUCTIONS_ED_ALL_ED_FT
Please follow-up with your primary care physician.  Below is information regarding a urologist.  If symptoms persist please follow-up with the urologist.    Macrobid 100 mg to be taken 2 times per day for the next 5 days for UTI.  Please complete the full course of antibiotics.    Urinary Tract Infection    A urinary tract infection (UTI) is an infection of any part of the urinary tract, which includes the kidneys, ureters, bladder, and urethra. Risk factors include ignoring your need to urinate, wiping back to front if female, being an uncircumcised male, and having diabetes or a weak immune system. Symptoms include frequent urination, pain or burning with urination, foul smelling urine, cloudy urine, pain in the lower abdomen, blood in the urine, and fever. If you were prescribed an antibiotic medicine, take it as told by your health care provider. Do not stop taking the antibiotic even if you start to feel better.    SEEK IMMEDIATE MEDICAL CARE IF YOU HAVE THE FOLLOWING SYMPTOMS: severe back or abdominal pain, inability to keep fluids or medicine down, dizziness/lightheadedness, or a change in mental status.

## 2024-04-27 NOTE — ED PROVIDER NOTE - ATTENDING CONTRIBUTION TO CARE
69-year-old female past med history of asthma, hypertension presents with vaginal bleeding.  Patient states that yesterday she was having some increased urination. denies any burning or pain with urination.  This morning started noticing some blood when she was wiping.  Feels like the blood is vaginal in nature.  Patient is menopausal.  No abdominal pain.  No fevers or chills.  No similar episodes in the past.  No nausea vomiting or diarrhea.    CONSTITUTIONAL: Well-developed; well-nourished; in no acute distress.   SKIN: warm, dry  HEAD: Normocephalic; atraumatic.  EYES: PERRL, EOMI, no conjunctival erythema  ENT: No nasal discharge; airway clear.  NECK: Supple; non tender.  CARD: S1, S2 normal;  Regular rate and rhythm.   RESP: No wheezes, rales or rhonchi.  ABD: soft non tender, non distended, no rebound or guarding. no cva tenderness.   EXT: Normal ROM.  5/5 strength in all 4 extremities   LYMPH: No acute cervical adenopathy.  NEURO: Alert, oriented, grossly unremarkable. neurovascularly intact  PSYCH: Cooperative, appropriate.

## 2024-04-27 NOTE — ED PROVIDER NOTE - CARE PROVIDER_API CALL
Fernando Morales  Urology  [address] [place] [id number]  Phone: [phone]  Fax: [phone]  Follow Up Time:

## 2024-04-27 NOTE — ED PROVIDER NOTE - CLINICAL SUMMARY MEDICAL DECISION MAKING FREE TEXT BOX
Patient presents with bleeding when wiping as well as some urinary frequency that started yesterday.  No fevers or chills.  No abdominal pain.  No nausea vomiting or diarrhea.  Patient concern for possible vaginal bleeding.  Labs UA sono done.  History of hysterectomy.  UA positive for infection.  Antibiotics given.  Discharged with PMD follow-up and return precautions.

## 2024-04-27 NOTE — ED PROVIDER NOTE - PATIENT PORTAL LINK FT
You can access the FollowMyHealth Patient Portal offered by [facility] by registering at the following website: [email or web address]. By joining [facility]’s FollowMyHealth portal, you will also be able to view your health information using other applications (apps) compatible with our system.

## 2024-04-27 NOTE — ED PROVIDER NOTE - OBJECTIVE STATEMENT
69-year-old female past medical history of asthma and hypertension presenting for evaluation of blood in her urine since Curtis last night into this morning.  Patient states that she has been having burning with urination and when she wiped was having streaks of red blood.  Patient believes that this is coming from her vagina and does not believe it is from her urine.  She denies any fevers, chills, nausea, vomiting, back pain, flank pain, chest pain, shortness of breath.  No recent travel.  No changes in diet.  No melena, no hematochezia.

## 2024-10-03 ENCOUNTER — APPOINTMENT (OUTPATIENT)
Dept: PULMONOLOGY | Facility: CLINIC | Age: 69
End: 2024-10-03
Payer: MEDICARE

## 2024-10-03 VITALS
BODY MASS INDEX: 25.83 KG/M2 | HEART RATE: 73 BPM | WEIGHT: 155 LBS | HEIGHT: 65 IN | OXYGEN SATURATION: 98 % | SYSTOLIC BLOOD PRESSURE: 124 MMHG | DIASTOLIC BLOOD PRESSURE: 88 MMHG

## 2024-10-03 DIAGNOSIS — J45.40 MODERATE PERSISTENT ASTHMA, UNCOMPLICATED: ICD-10-CM

## 2024-10-03 DIAGNOSIS — R05.9 COUGH, UNSPECIFIED: ICD-10-CM

## 2024-10-03 PROCEDURE — 99214 OFFICE O/P EST MOD 30 MIN: CPT

## 2024-10-03 NOTE — HISTORY OF PRESENT ILLNESS
[Never] : never [TextBox_4] : Ms. TRIPP is a 67 year woman with a medical history significant for mild asthma presenting today to the clinic for subacute cough. She was referred by her PCP for further evaluation of bronchitis/asthma  Patient summary: Ms Tripp's symptoms began on November 16, with acute onset cough and malaise.  She presented the following day to urgent care for testing for COVID, and was found to be negative.  She underwent chest x-ray which she was told showed bronchitis.  At this point she was already taking Pulmicort and albuterol quite regularly without much relief.  For the next several weeks she continued to take these 2 medications, still without relief and significant mucus production, until an appointment with her primary care doctor the week of December 14.  Her primary care doctor started her on Breo, and she was given a 5-day course of azithromycin, and told to follow-up with pulmonary.  At this time, her cough symptoms have almost completely abated.  She reports never having had an asthma flare like this before, and has never had a bronchitis episode that has lasted so long.  At our initial visit, we started her on PRN Symbicort based on obstructive findings on spirometry.  Due to insurance reasons she was changed to Breo, and her symptoms have essentially resolved as of June 2023 with maintenance medication.  Occupational Hx: retired [employer] employee   Interval history:  0-1x/day breo 0x/day albuterol 0x/week night awakening  Feels great, rarely ever needs her inhaler these days.

## 2025-03-31 ENCOUNTER — OUTPATIENT (OUTPATIENT)
Dept: OUTPATIENT SERVICES | Facility: HOSPITAL | Age: 70
LOS: 1 days | End: 2025-03-31
Payer: MEDICARE

## 2025-03-31 DIAGNOSIS — Z13.820 ENCOUNTER FOR SCREENING FOR OSTEOPOROSIS: ICD-10-CM

## 2025-03-31 DIAGNOSIS — Z00.8 ENCOUNTER FOR OTHER GENERAL EXAMINATION: ICD-10-CM

## 2025-03-31 PROCEDURE — 77080 DXA BONE DENSITY AXIAL: CPT | Mod: 26

## 2025-03-31 PROCEDURE — 77080 DXA BONE DENSITY AXIAL: CPT

## 2025-04-01 DIAGNOSIS — Z13.820 ENCOUNTER FOR SCREENING FOR OSTEOPOROSIS: ICD-10-CM

## 2025-04-04 DIAGNOSIS — M81.0 AGE-RELATED OSTEOPOROSIS WITHOUT CURRENT PATHOLOGICAL FRACTURE: ICD-10-CM
